# Patient Record
Sex: FEMALE | Race: WHITE | NOT HISPANIC OR LATINO | URBAN - NONMETROPOLITAN AREA
[De-identification: names, ages, dates, MRNs, and addresses within clinical notes are randomized per-mention and may not be internally consistent; named-entity substitution may affect disease eponyms.]

---

## 2019-07-03 ENCOUNTER — IMPORTED ENCOUNTER (OUTPATIENT)
Dept: URBAN - NONMETROPOLITAN AREA CLINIC 1 | Facility: CLINIC | Age: 34
End: 2019-07-03

## 2019-07-03 PROCEDURE — 92015 DETERMINE REFRACTIVE STATE: CPT

## 2019-07-03 PROCEDURE — 92014 COMPRE OPH EXAM EST PT 1/>: CPT

## 2019-07-03 PROCEDURE — 92310 CONTACT LENS FITTING OU: CPT

## 2020-12-17 NOTE — PROCEDURE NOTE: SURGICAL
Prior to commencing surgery, patient identification, surgical procedure, site, and side were confirmed by Dr. James Del Real. Following topical proparacaine anesthesia, the patient was positioned at the YAG laser, a contact lens coupled to the cornea of the right eye with methylcellulose and a peripheral iridotomy placed using 3.5 Mj x 5 without complication. Attention was turned to the left eye and the patient was positioned at the YAG laser, a contact lens coupled to the cornea with methylcellulose and a peripheral iridotomy placed using 3.4 Mj x 4 without complication. Excess methylcellulose was washed from both eyes, one drop of Econopred Plus 1% instilled and the patient returned to the holding area having tolerated the procedure well and without complication. <br />[id number]<YB /><br />

## 2021-04-05 NOTE — PATIENT DISCUSSION
4. 5.2021 reviewed RD symptoms of any type rto, pt. didn't want a dilation until August at his annual exam usual time.

## 2021-04-05 NOTE — PATIENT DISCUSSION
4. 5.2021 Pt. came for PE didn't think he would be dilated, discussed protocol for PVD, his last full exam was in August, he prefers to have dilation in August and not today, discussed reason for dilation and reviewed RD symptoms, pt. is driving today and has concerns.

## 2021-07-14 ENCOUNTER — IMPORTED ENCOUNTER (OUTPATIENT)
Dept: URBAN - NONMETROPOLITAN AREA CLINIC 1 | Facility: CLINIC | Age: 36
End: 2021-07-14

## 2021-07-14 PROCEDURE — 92014 COMPRE OPH EXAM EST PT 1/>: CPT

## 2021-07-14 PROCEDURE — 92015 DETERMINE REFRACTIVE STATE: CPT

## 2021-07-14 PROCEDURE — 92310 CONTACT LENS FITTING OU: CPT

## 2021-10-18 NOTE — PATIENT DISCUSSION
Roshan Erickson checked rx and then [facility] checked rx, os was +.25 OS (patient consistently took more + in fact he thought +.50 more was the same as +.25 more)  more for [facility] (Roshan Erickson got +1.75 OS) and 2 degrees of axis difference, discussed this is a subjective test, discussed we are sorry he had a problem with the prior rx, Discussed he has NS.

## 2022-04-15 ASSESSMENT — VISUAL ACUITY
OS_SC: 20/20
OU_CC: J1+
OD_SC: 20/20
OS_SC: 20/20
OD_SC: 20/20

## 2022-04-15 ASSESSMENT — TONOMETRY
OS_IOP_MMHG: 18
OD_IOP_MMHG: 16
OS_IOP_MMHG: 16
OD_IOP_MMHG: 18

## 2022-08-19 NOTE — PATIENT DISCUSSION
[facility] checked rx and then [facility] checked rx, os was +.25 OS (patient consistently took more + in fact he thought +.50 more was the same as +.25 more)  more for [facility] ([facility] got +1.75 OS) and 2 degrees of axis difference, discussed this is a subjective test, discussed we are sorry he had a problem with the prior rx, Discussed he has NS.

## 2022-08-23 NOTE — PATIENT DISCUSSION
Candidate for all lens options. Interested in [id number] [facility]. Not ready to schedule now, wants to go home and talk to wife. May schedule early next year.

## 2022-10-10 ENCOUNTER — ESTABLISHED PATIENT (OUTPATIENT)
Dept: RURAL CLINIC 1 | Facility: CLINIC | Age: 37
End: 2022-10-10

## 2022-10-10 DIAGNOSIS — H52.13: ICD-10-CM

## 2022-10-10 DIAGNOSIS — H52.223: ICD-10-CM

## 2022-10-10 PROCEDURE — 92015 DETERMINE REFRACTIVE STATE: CPT

## 2022-10-10 PROCEDURE — 92310-E CONTACT LENS FITTING ESTABLISH PATIENT

## 2022-10-10 PROCEDURE — 92014 COMPRE OPH EXAM EST PT 1/>: CPT

## 2022-10-10 ASSESSMENT — TONOMETRY
OS_IOP_MMHG: 16
OD_IOP_MMHG: 16

## 2022-10-10 ASSESSMENT — VISUAL ACUITY
OD_CC: 20/20-1
OU_CC: 20/25
OS_CC: 20/20-1
OU_CC: 20/20-1

## 2024-04-01 ENCOUNTER — ESTABLISHED PATIENT (OUTPATIENT)
Dept: RURAL CLINIC 1 | Facility: CLINIC | Age: 39
End: 2024-04-01

## 2024-04-01 DIAGNOSIS — H52.13: ICD-10-CM

## 2024-04-01 DIAGNOSIS — H52.223: ICD-10-CM

## 2024-04-01 PROCEDURE — 92310-E CONTACT LENS FITTING ESTABLISH PATIENT

## 2024-04-01 PROCEDURE — 92014 COMPRE OPH EXAM EST PT 1/>: CPT

## 2024-04-01 PROCEDURE — 92015 DETERMINE REFRACTIVE STATE: CPT

## 2024-04-01 ASSESSMENT — TONOMETRY
OS_IOP_MMHG: 17
OD_IOP_MMHG: 16

## 2024-04-01 ASSESSMENT — VISUAL ACUITY
OU_CC: 20/20
OD_CC: 20/20
OS_CC: 20/20

## 2024-09-06 NOTE — PATIENT DISCUSSION
"*[facility]:.-	  A new spectacle prescription was created and dispensed today. ""Continue current CL Rx. Rx given. CL wear-CLs fit and center well.-Stressed that patient should not sleep in CL. -Updated CL Rx given. -CL care and precautions given. " No

## 2025-04-07 ENCOUNTER — COMPREHENSIVE EXAM (OUTPATIENT)
Age: 40
End: 2025-04-07

## 2025-04-07 DIAGNOSIS — H52.13: ICD-10-CM

## 2025-04-07 DIAGNOSIS — H52.223: ICD-10-CM

## 2025-04-07 PROCEDURE — 92015 DETERMINE REFRACTIVE STATE: CPT

## 2025-04-07 PROCEDURE — 92014 COMPRE OPH EXAM EST PT 1/>: CPT

## 2025-04-07 PROCEDURE — 92310-1 LEVEL 1 SOFT LENS UPDATE
